# Patient Record
Sex: FEMALE | Race: WHITE | URBAN - METROPOLITAN AREA
[De-identification: names, ages, dates, MRNs, and addresses within clinical notes are randomized per-mention and may not be internally consistent; named-entity substitution may affect disease eponyms.]

---

## 2023-09-24 ENCOUNTER — HOSPITAL ENCOUNTER (OUTPATIENT)
Age: 28
Discharge: HOME | End: 2023-09-24
Payer: COMMERCIAL

## 2023-09-24 VITALS
RESPIRATION RATE: 18 BRPM | HEART RATE: 82 BPM | DIASTOLIC BLOOD PRESSURE: 72 MMHG | OXYGEN SATURATION: 99 % | TEMPERATURE: 98 F | SYSTOLIC BLOOD PRESSURE: 144 MMHG

## 2023-09-24 DIAGNOSIS — J02.9 ACUTE VIRAL PHARYNGITIS: Primary | ICD-10-CM

## 2023-09-24 DIAGNOSIS — Z20.822 ENCOUNTER FOR LABORATORY TESTING FOR COVID-19 VIRUS: ICD-10-CM

## 2023-09-24 LAB
S PYO AG THROAT QL: NEGATIVE
SARS-COV-2 RNA RESP QL NAA+PROBE: NOT DETECTED

## 2023-09-24 RX ORDER — FLUOXETINE HYDROCHLORIDE 20 MG/1
CAPSULE ORAL
COMMUNITY

## 2023-09-24 RX ORDER — NORGESTREL AND ETHINYL ESTRADIOL 0.3-0.03MG
1 KIT ORAL DAILY
COMMUNITY

## 2023-09-24 RX ORDER — METHYLPHENIDATE HYDROCHLORIDE 20 MG/1
1 CAPSULE, EXTENDED RELEASE ORAL EVERY MORNING
COMMUNITY

## 2023-09-29 ENCOUNTER — APPOINTMENT (OUTPATIENT)
Dept: GENERAL RADIOLOGY | Age: 28
End: 2023-09-29
Attending: NURSE PRACTITIONER
Payer: COMMERCIAL

## 2023-09-29 ENCOUNTER — HOSPITAL ENCOUNTER (OUTPATIENT)
Age: 28
Discharge: HOME | End: 2023-09-29
Payer: COMMERCIAL

## 2023-09-29 VITALS
SYSTOLIC BLOOD PRESSURE: 128 MMHG | DIASTOLIC BLOOD PRESSURE: 72 MMHG | HEART RATE: 81 BPM | OXYGEN SATURATION: 100 % | RESPIRATION RATE: 20 BRPM | TEMPERATURE: 98 F

## 2023-09-29 DIAGNOSIS — R05.1 ACUTE COUGH: ICD-10-CM

## 2023-09-29 DIAGNOSIS — J20.9 ACUTE BRONCHITIS, UNSPECIFIED ORGANISM: Primary | ICD-10-CM

## 2023-09-29 PROCEDURE — 99213 OFFICE O/P EST LOW 20 MIN: CPT | Performed by: NURSE PRACTITIONER

## 2023-09-29 PROCEDURE — 71046 X-RAY EXAM CHEST 2 VIEWS: CPT | Performed by: NURSE PRACTITIONER

## 2023-09-29 PROCEDURE — 94640 AIRWAY INHALATION TREATMENT: CPT | Performed by: NURSE PRACTITIONER

## 2023-09-29 RX ORDER — ALBUTEROL SULFATE 90 UG/1
2 AEROSOL, METERED RESPIRATORY (INHALATION) EVERY 4 HOURS PRN
Qty: 1 EACH | Refills: 0 | Status: SHIPPED | OUTPATIENT
Start: 2023-09-29 | End: 2023-10-29

## 2023-09-29 RX ORDER — PREDNISONE 20 MG/1
40 TABLET ORAL DAILY
Qty: 10 TABLET | Refills: 0 | Status: SHIPPED | OUTPATIENT
Start: 2023-09-29 | End: 2023-10-04

## 2023-09-29 RX ORDER — BENZONATATE 200 MG/1
200 CAPSULE ORAL 3 TIMES DAILY PRN
Qty: 15 CAPSULE | Refills: 0 | Status: SHIPPED | OUTPATIENT
Start: 2023-09-29

## 2023-09-29 RX ORDER — ALBUTEROL SULFATE 2.5 MG/3ML
2.5 SOLUTION RESPIRATORY (INHALATION) ONCE
Status: COMPLETED | OUTPATIENT
Start: 2023-09-29 | End: 2023-09-29

## 2023-09-29 NOTE — ED INITIAL ASSESSMENT (HPI)
Pt here with complaints of fevers , cough and congestion for 6 days, pt states today she started having sob , pt requesting xray

## 2024-01-09 ENCOUNTER — APPOINTMENT (OUTPATIENT)
Dept: GENERAL RADIOLOGY | Age: 29
End: 2024-01-09
Attending: NURSE PRACTITIONER
Payer: COMMERCIAL

## 2024-01-09 ENCOUNTER — HOSPITAL ENCOUNTER (OUTPATIENT)
Age: 29
Discharge: HOME | End: 2024-01-09
Payer: COMMERCIAL

## 2024-01-09 VITALS
SYSTOLIC BLOOD PRESSURE: 137 MMHG | HEART RATE: 106 BPM | RESPIRATION RATE: 20 BRPM | OXYGEN SATURATION: 98 % | DIASTOLIC BLOOD PRESSURE: 76 MMHG | TEMPERATURE: 100 F

## 2024-01-09 DIAGNOSIS — J98.8 VIRAL RESPIRATORY ILLNESS: Primary | ICD-10-CM

## 2024-01-09 DIAGNOSIS — R06.2 WHEEZING: ICD-10-CM

## 2024-01-09 DIAGNOSIS — Z20.822 LAB TEST NEGATIVE FOR COVID-19 VIRUS: ICD-10-CM

## 2024-01-09 DIAGNOSIS — B97.89 VIRAL RESPIRATORY ILLNESS: Primary | ICD-10-CM

## 2024-01-09 DIAGNOSIS — R50.9 FEVER IN ADULT: ICD-10-CM

## 2024-01-09 LAB
POCT INFLUENZA A: NEGATIVE
POCT INFLUENZA B: NEGATIVE
SARS-COV-2 RNA RESP QL NAA+PROBE: NOT DETECTED

## 2024-01-09 PROCEDURE — 99213 OFFICE O/P EST LOW 20 MIN: CPT | Performed by: NURSE PRACTITIONER

## 2024-01-09 PROCEDURE — 71046 X-RAY EXAM CHEST 2 VIEWS: CPT | Performed by: NURSE PRACTITIONER

## 2024-01-09 PROCEDURE — 87502 INFLUENZA DNA AMP PROBE: CPT | Performed by: NURSE PRACTITIONER

## 2024-01-09 PROCEDURE — U0002 COVID-19 LAB TEST NON-CDC: HCPCS | Performed by: NURSE PRACTITIONER

## 2024-01-09 PROCEDURE — 94640 AIRWAY INHALATION TREATMENT: CPT | Performed by: NURSE PRACTITIONER

## 2024-01-09 RX ORDER — BENZONATATE 200 MG/1
200 CAPSULE ORAL 3 TIMES DAILY PRN
Qty: 20 CAPSULE | Refills: 0 | Status: SHIPPED | OUTPATIENT
Start: 2024-01-09

## 2024-01-09 RX ORDER — ALBUTEROL SULFATE 90 UG/1
2 AEROSOL, METERED RESPIRATORY (INHALATION) EVERY 4 HOURS PRN
Qty: 1 EACH | Refills: 0 | Status: SHIPPED | OUTPATIENT
Start: 2024-01-09 | End: 2024-02-08

## 2024-01-09 RX ORDER — IPRATROPIUM BROMIDE AND ALBUTEROL SULFATE 2.5; .5 MG/3ML; MG/3ML
3 SOLUTION RESPIRATORY (INHALATION) ONCE
Status: COMPLETED | OUTPATIENT
Start: 2024-01-09 | End: 2024-01-09

## 2024-01-09 NOTE — ED INITIAL ASSESSMENT (HPI)
Pt came in due to cough, congestion, fatigue, body aches, fever, and chills since yesterday. Pt has easy non labored respirations. Pt stated she was exposed to RSV and to \"sick people\".

## 2024-01-09 NOTE — ED PROVIDER NOTES
Patient Seen in: [facility]      History     Chief Complaint   Patient presents with    Cough/URI     Stated Complaint: Fever,Cough,nausa    Subjective:   HPI    This is a 28-year-old female presenting with cough congestion fatigue body aches and chills.  Patient states she has had numerous sick contacts and was exposed to RSV.  Patient states she has had coughing with wheezing in the past but now she has had the symptoms for about 1 to 2 days.  Denies any chest pain shortness of breath nausea vomiting or diarrhea.  No history of asthma.  Patient states she does have indoor outdoor allergies and some other things that she is allergic to.    Objective:   Past Medical History:   Diagnosis Date    ADHD     Anxiety     Depression               History reviewed. No pertinent surgical history.             No pertinent social history.            Review of Systems    Positive for stated complaint: Fever,Cough,nausa  Other systems are as noted in HPI.  Constitutional and vital signs reviewed.      All other systems reviewed and negative except as noted above.    Physical Exam     ED Triage Vitals [01/09/24 1429]   /76   Pulse 106   Resp 20   Temp 99.7 °F (37.6 °C)   Temp src Temporal   SpO2 98 %   O2 Device None (Room air)       Current:/76   Pulse 106   Temp 99.7 °F (37.6 °C) (Temporal)   Resp 20   LMP 01/02/2024 (Approximate)   SpO2 98%         Physical Exam  Vitals and nursing note reviewed.   Constitutional:       Appearance: Normal appearance.   HENT:      Right Ear: Tympanic membrane normal.      Left Ear: Tympanic membrane normal.      Nose: Congestion present.      Mouth/Throat:      Mouth: Mucous membranes are moist.      Pharynx: Oropharynx is clear. No oropharyngeal exudate or posterior oropharyngeal erythema.   Eyes:      Conjunctiva/sclera: Conjunctivae normal.   Cardiovascular:      Rate and Rhythm: Normal rate.   Pulmonary:      Effort: Pulmonary effort is normal. No respiratory  distress.      Comments: + Coughing during exam, + inspiratory wheeze noted on auscultation of lung sounds no rhonchi or rails  Musculoskeletal:         General: Normal range of motion.      Cervical back: Normal range of motion. No rigidity or tenderness.   Lymphadenopathy:      Cervical: No cervical adenopathy.   Skin:     General: Skin is warm and dry.      Capillary Refill: Capillary refill takes less than 2 seconds.   Neurological:      General: No focal deficit present.      Mental Status: She is alert and oriented to person, place, and time.   Psychiatric:         Mood and Affect: Mood normal.               ED Course     Labs Reviewed   POCT FLU TEST - Normal    Narrative:     This assay is a rapid molecular in vitro test utilizing nucleic acid amplification of influenza A and B viral RNA.   RAPID SARS-COV-2 BY PCR - Normal        XR CHEST PA + LAT CHEST (CPT=71046)    Result Date: 1/9/2024  CONCLUSION:  1. No acute cardiopulmonary disease    Dictated by (CST): Thomas Cain MD on 1/09/2024 at 2:56 PM     Finalized by (CST): Thomas Cain MD on 1/09/2024 at 2:58 PM                       MDM          Medical Decision Making  28-year-old female low-grade temp no hypoxia or distress but noted inspiratory wheezing on auscultation of lung sounds also with additional viral symptoms concern for possible RSV or pneumonia or COVID or flu or another viral illness.  No clinical indication for labs chest x-ray COVID and flu will be ordered nodule nail.  Patient is agreeable with this plan of care    Chest x-ray independently viewed by this provider no pneumonia    COVID and flu negative patient made aware results discussed likely a viral illness or another respiratory viral illness.  Discussed treatment with benzonatate albuterol inhaler and discussed over-the-counter Flonase and Zyrtec to help with runny nose or congestion.  Discussed ibuprofen or Tylenol for pain.  Discussed return ICC or ER precautions  with any new or worsening symptoms.  Patient acknowledges understanding discharge instructions.    Problems Addressed:  Fever in adult: acute illness or injury  Viral respiratory illness: acute illness or injury  Wheezing: acute illness or injury    Amount and/or Complexity of Data Reviewed  Labs:  Decision-making details documented in ED Course.  Radiology:  Decision-making details documented in ED Course.    Risk  OTC drugs.  Prescription drug management.        Disposition and Plan     Clinical Impression:  1. Viral respiratory illness    2. Wheezing    3. Fever in adult    4. Lab test negative for COVID-19 virus         Disposition:  Discharge  1/9/2024  3:07 pm    Follow-up:  Sharath Crook DO  [address] [place] [id number]  [phone]    In 1 week            Medications Prescribed:  Current Discharge Medication List        START taking these medications    Details   !! benzonatate 200 MG Oral Cap Take 1 capsule (200 mg total) by mouth 3 (three) times daily as needed for cough.  Qty: 20 capsule, Refills: 0       !! - Potential duplicate medications found. Please discuss with provider.

## 2024-01-09 NOTE — DISCHARGE INSTRUCTIONS
Take over-the-counter Tylenol or ibuprofen as needed for fever comfort or pain.  Drink plenty of fluids warm tea with honey.  Use albuterol as needed to help with any cough or wheezing.  Take the benzonatate as needed to help with the cough.  You may try over-the-counter Flonase nasal spray and 24-hour Zyrtec tablet to help with runny nose congestion or postnasal drip.  Follow-up with your primary care provider within a week.  If you develop any new or worsening symptoms chest pain shortness of breath nausea vomiting diarrhea or any new or worsening symptoms go to the nearest emergency department.

## (undated) NOTE — LETTER
Date & Time: 9/24/2023, 3:15 PM  Patient: Alan Connor  Encounter Provider(s):    MICA Oden       To Whom It May Concern:    Alan Connor was seen and treated in our department on 9/24/2023. Please excuse Sabrina Beaulieu from work 9/25/2023. She may return 9/26/2023. If you have any questions or concerns, please do not hesitate to call.       MICA Rogers      Physician/[facility] Signature